# Patient Record
Sex: MALE | NOT HISPANIC OR LATINO
[De-identification: names, ages, dates, MRNs, and addresses within clinical notes are randomized per-mention and may not be internally consistent; named-entity substitution may affect disease eponyms.]

---

## 2020-04-14 ENCOUNTER — APPOINTMENT (OUTPATIENT)
Dept: DISASTER EMERGENCY | Facility: CLINIC | Age: 80
End: 2020-04-14
Payer: MEDICARE

## 2020-04-14 VITALS
DIASTOLIC BLOOD PRESSURE: 82 MMHG | SYSTOLIC BLOOD PRESSURE: 140 MMHG | TEMPERATURE: 99 F | OXYGEN SATURATION: 97 % | HEART RATE: 92 BPM | RESPIRATION RATE: 12 BRPM

## 2020-04-14 DIAGNOSIS — R68.89 OTHER GENERAL SYMPTOMS AND SIGNS: ICD-10-CM

## 2020-04-14 PROCEDURE — 99202 OFFICE O/P NEW SF 15 MIN: CPT | Mod: CS

## 2020-04-14 RX ORDER — ASPIRIN 81 MG
81 TABLET, DELAYED RELEASE (ENTERIC COATED) ORAL
Refills: 0 | Status: ACTIVE | COMMUNITY

## 2020-04-14 NOTE — HISTORY OF PRESENT ILLNESS
[Patient presents to the office today for COVID-19 evaluation and testing.] : Patient presents to the office today for COVID-19 evaluation and testing. [Age >= 60 years] : age >= 60 years [Heart Disease] : heart disease [COVID-19 testing ordered and specimen obtained] : COVID-19 testing ordered and specimen obtained [Discharged with current Quarantine instructions and advised of signs of worsening illness.] : Patient discharged with current quarantine instructions and advised of signs of worsening illness. Patient told to seek emergent care if symptoms occur. [] : no dyspnea on exertion [None] : none [Clear] : clear [Speaks in full sentences] : speaks in full sentences [Normal O2 sat at rest] : normal O2 sat at rest [Grossly normal, interacts, not tired or weak] : grossly normal, interacts, not tired or weak [FreeTextEntry1] : \par 80 y.o M with HTN, AFib s/p ablation, mitral valve prolapse s/p bioprosthetic valve (1/2019), BPH, presenting today with c.o weakness, dry cough for the past 2 weeks.  Associated symptoms include myalgias. Has attempted acetaminophen with some improvement. Of note, reports no known exposure to COVID-19. Admits to loose stools. Denies CP, SOB, AJ, palpitations, lightheadedness, fever, chills, nausea, vomiting, or recent travel.\par  [TextBox_[id number]] : \par -Likely dx of COVID-19, will test given age and comorbidities\par -Supportive care advised: Encouraged to increase fluids, rest, and take tylenol prn as per 's recommendations. May attempt warm water gargles with salt TID\par -Discussed quarantine until 72 hours symptom free including fever free without use of tylenol\par -Literature on COVID-19 and measures to prevent exposure to others provided and reviewed with pt\par -Pt to be notified by Anna of results

## 2020-04-15 LAB — SARS-COV-2 N GENE NPH QL NAA+PROBE: DETECTED
